# Patient Record
Sex: FEMALE | Race: OTHER | HISPANIC OR LATINO | URBAN - METROPOLITAN AREA
[De-identification: names, ages, dates, MRNs, and addresses within clinical notes are randomized per-mention and may not be internally consistent; named-entity substitution may affect disease eponyms.]

---
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
## 2025-02-04 NOTE — ED PEDIATRIC TRIAGE NOTE - NS ED NOTE AC HIGH RISK COUNTRIES
Detail Level: Zone No Sunscreen Recommendations: Broad spectrum SPF 30+ UVA / UVB recommended to patient Detail Level: Generalized